# Patient Record
Sex: MALE | Race: WHITE
[De-identification: names, ages, dates, MRNs, and addresses within clinical notes are randomized per-mention and may not be internally consistent; named-entity substitution may affect disease eponyms.]

---

## 2019-06-27 ENCOUNTER — HOSPITAL ENCOUNTER (EMERGENCY)
Dept: HOSPITAL 74 - FER | Age: 39
Discharge: HOME | End: 2019-06-27
Payer: COMMERCIAL

## 2019-06-27 VITALS — BODY MASS INDEX: 27.8 KG/M2

## 2019-06-27 VITALS — TEMPERATURE: 98.8 F | SYSTOLIC BLOOD PRESSURE: 119 MMHG | HEART RATE: 68 BPM | DIASTOLIC BLOOD PRESSURE: 89 MMHG

## 2019-06-27 DIAGNOSIS — M54.5: Primary | ICD-10-CM

## 2019-06-27 DIAGNOSIS — E03.9: ICD-10-CM

## 2019-06-27 DIAGNOSIS — M06.9: ICD-10-CM

## 2019-06-27 NOTE — PDOC
Documentation entered by Mario Alberto Saunders SCRIBE, acting as scribe for Juan Ramon Grey MD.








Juan Ramon Grey MD:  This documentation has been prepared by the Chip rivero Collisia, SCRIBE, under my direction and personally reviewed by me in its 

entirety.  I confirm that the documentation accurately reflects all work, 

treatment, procedures, and medical decision making performed by me.  





History of Present Illness





- General


Chief Complaint: Pain, Acute


Stated Complaint: BACK PAIN


Time Seen by Provider: 06/27/19 14:07


History Source: Patient


Exam Limitations: No Limitations





- History of Present Illness


Initial Comments: 





06/27/19 14:40


The patient is a 38 year old male with a significant past medical history of 

rheumatoid arthritis and hypothyroidism who presents to the emergency 

department with worsening lower back pain for 1 week. The patient states that 

on onset of his lower back pain severity was about a 4/10 by which he went to 

his Rheumatologist 3 days ago and was advised to take cyclobenzaprine. The 

patient states that since then his back pain has gradually worsened, constant 

and spasm-like. He states that his lower back pain is worsened with movement 

and listing. The patient reports trying other forms of pain relief patches and 

creams with no apparent relief. He states that he took 1 aleve today and came 

to the ED for further evaluation of his pain. It is noted that the patient 

works as a  and he is in the [employer]. He endorses a prior 

back injury years ago from work but denies this feeling like that.  He denies 

any numbness, weakness or tingling sensations. He denies any fever, chills, 

nausea, vomiting, diarrhea, constipation or urinary symptoms. The patient 

denies any other complaints. 





Past History





- Past Medical History


Allergies/Adverse Reactions: 


 Allergies











Allergy/AdvReac Type Severity Reaction Status Date / Time


 


No Known Allergies Allergy   Verified 06/27/19 14:11











Home Medications: 


Ambulatory Orders





Cyclobenzaprine HCl [Flexeril -] 10 mg PO TID 06/27/19 


Folic Acid 1 mg PO DAILY 06/27/19 


Levothyroxine [Synthroid -] 125 mcg PO DAILY 06/27/19 


Lidocaine 5% Patch [Lidoderm Patch -] 1 patch TP DAILY PRN #7 patch 06/27/19 


Methocarbamol [Robaxin -] 500 mg PO BID PRN #14 tablet 06/27/19 


Methotrexate Sodium [Methotrexate] 25 mg .ROUTE WEEKLY 06/27/19 


Naproxen 500 mg PO BID PRN #20 tablet 06/27/19 


Oxycodone HCl/Acetaminophen [Percocet 5-325 mg Tablet] 1 tab PO Q6H PRN #5 

tablet MDD 4 06/27/19 











**Review of Systems





- Review of Systems


Able to Perform ROS?: Yes


Comments:: 





06/27/19 14:40


ADULT ROS


GENERAL/CONSTITUTIONAL: No fever or chills. No weakness.


HEAD, EYES, EARS, NOSE AND THROAT: No change in vision. No ear pain or 

discharge. No sore throat.


CARDIOVASCULAR: No chest pain or shortness of breath.


RESPIRATORY: No cough, wheezing, or hemoptysis.


GASTROINTESTINAL: No nausea, vomiting, diarrhea or constipation.


GENITOURINARY: No dysuria, frequency, or change in urination.


MUSCULOSKELETAL:(+)back pain.  No joint or muscle swelling or pain. No neck 

pain.


SKIN: No rash


NEUROLOGIC: No headache, vertigo, loss of consciousness, or change in strength/

sensation.


ENDOCRINE: No increased thirst. No abnormal weight change.


HEMATOLOGIC/LYMPHATIC: No anemia, easy bleeding, or history of blood clots.


ALLERGIC/IMMUNOLOGIC: No hives or skin allergy.








*Physical Exam





- Physical Exam


Comments: 





06/27/19 15:28


GENERAL: Awake, alert, and fully oriented, in no acute distress


HEAD: No signs of trauma


NECK: Normal ROM, supple, no lymphadenopathy, JVD, or masses. No CVA tenderness


ABDOMEN: Soft, nontender, normoactive bowel sounds.  No guarding, no rebound.  

No masses


EXTREMITIES: Normal range of motion, no edema.  No clubbing or cyanosis. No 

cords, erythema, or tenderness


BACK: (+)bilateral paralumbar muscle tender to palpation, reproducible with 

extension and flexion. No numbness or weakness. No spine tenderness. No stepoffs


NEUROLOGICAL: Cranial nerves II through XII grossly intact.  Normal speech, 

normal gait


SKIN: Warm, Dry, normal turgor, no rashes or lesions noted.








Medical Decision Making





- Medical Decision Making





06/27/19 14:11





A portion of this note was documented by scribe services under my direction. I 

have reviewed the details of the note, within reason, and agree with the 

documentation with the following case summary and management plan written by 

me. 





Patient treated in the ED.





Nursing notes are reviewed and incorporated into the medical decision-making.


Vital signs reviewed.





Peripheral IV access obtained by the nurse, laboratory studies are drawn and 

sent, reviewed and interpreted by myself. 





Vital Signs











Temp Pulse Resp BP Pulse Ox


 


 98.8 F   68   18   119/89   100 


 


 06/27/19 14:07  06/27/19 14:07  06/27/19 14:07  06/27/19 14:07  06/27/19 14:07








38-year-old male with history of idiopathic arthritis on weekly methotrexate 

presents with lower back pain. 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 

officer as well as works part-time in the [employer]. He is unaware of any 

injuries or events but reported last week of gradually worsening lower back 

muscle spasm worst with movement. States that rest improves the symptoms. 

Denies any urinary or bowel incontinence. Denies any numbness or weakness. 

Patient initially took naproxen and Lidoderm patch and low dose Flexeril with 

some relief but the symptoms have gradually worsened to patient's came to the 

ER. Patient also reports that his wife is also getting surgery tomorrow and 

that the patient like to have manageable pain prior to caring for his wife.





At this time, patient's symptoms do not seem to indicates red flags such as 

neurological competitions. However, patient does have history of arthritis. We'

ll obtain a CAT scan of the lumbar spine to evaluate for arthritis. We'll trial 

Percocets and reassess.





This report was requested by: Juan Ramon Grey | Reference #: [id number]





There are no results for the search terms that you entered.








06/27/19 16:08





CT scan demonstrates L4-L5 mild to moderate central and left paracentral disc 

bulge probably slightly impinging left L4 nerve root. L5-S1 minimal central 

disc bulge without nerve root.





The patient reports feeling better with the Percocet. He reports that he will 

be picked up from the hospital. I suspect patient likely has accommodation of 

back spasm and disc herniation. I will give a prescription for naproxen, robaxin

, and rescue percocet. Patient will also be given a prescription for Lidoderm 

patches. I advised patient that he would benefit from physical therapy and will 

refer him to an orthopedist.





Patient verbalizes understanding agrees with the plan. We will treat as muscle 

skeletal at this time. Return precautions given.





I discussed the physical exam findings, ancillary test results and final 

diagnoses with the patient.  I answered all of the patient's questions.  The 

patient was satisfied with the care received and felt comfortable with the 

discharge plan and treatment plan.  The patient will call their primary care 

physician within 24 hours to arrange follow-up and will return to the Emergency 

Department with any new, persistant or worsening symptoms. 





*DC/Admit/Observation/Transfer


Diagnosis at time of Disposition: 


Back pain


Qualifiers:


 Back pain location: low back pain Chronicity: acute Back pain laterality: 

unspecified Sciatica presence: without sciatica Qualified Code(s): M54.5 - Low 

back pain








- Discharge Dispostion


Disposition: HOME


Condition at time of disposition: Stable


Decision to Admit order: No





- Prescriptions


Prescriptions: 


Lidocaine 5% Patch [Lidoderm Patch -] 1 patch TP DAILY PRN #7 patch


 PRN Reason: Back Pain


Methocarbamol [Robaxin -] 500 mg PO BID PRN #14 tablet


 PRN Reason: Back Spasm


Naproxen 500 mg PO BID PRN #20 tablet


 PRN Reason: Back Pain


Oxycodone HCl/Acetaminophen [Percocet 5-325 mg Tablet] 1 tab PO Q6H PRN #5 

tablet MDD 4


 PRN Reason: Pain





- Referrals


Referrals: 


Bandar Sequeira MD [Staff Physician] - 





- Patient Instructions


Printed Discharge Instructions:  Back Pain (Alternative Therapy), DI for Low 

Back Pain


Additional Instructions: 


You have a disc herniation and lower back spasm.


Please take 500 mg naproxen every 12 hours as needed for pain.


For muscle relaxant, you can use robaxin 500 mg every 12 hours as needed.


For severe pain control, take one tablet of percocet 6 to 8 hours as needed.


These medications may make you drowsy so please do not drink alcohol or drive 

on this medication.


It is very important that you follow up with a spine doctor.


You may need physical therapy and further evaluation.


Please call to schedule an appointment.





- Post Discharge Activity


Forms/Work/School Notes:  Back to Work